# Patient Record
Sex: FEMALE | Race: WHITE | NOT HISPANIC OR LATINO | Employment: FULL TIME | URBAN - METROPOLITAN AREA
[De-identification: names, ages, dates, MRNs, and addresses within clinical notes are randomized per-mention and may not be internally consistent; named-entity substitution may affect disease eponyms.]

---

## 2017-04-14 ENCOUNTER — TRANSFERRED RECORDS (OUTPATIENT)
Dept: HEALTH INFORMATION MANAGEMENT | Facility: CLINIC | Age: 32
End: 2017-04-14

## 2017-04-14 LAB
ALT SERPL-CCNC: 27 IU/L (ref 6–31)
AST SERPL-CCNC: 17 IU/L (ref 10–40)
CHOLEST SERPL-MCNC: 157 MG/DL (ref 114–200)
CREAT SERPL-MCNC: 0.81 MG/DL (ref 0.4–1)
GFR SERPL CREATININE-BSD FRML MDRD: >60 ML/MIN/1.73M2
GLUCOSE SERPL-MCNC: 84 MG/DL (ref 70–100)
HBA1C MFR BLD: 5.3 % (ref 4–6)
HDLC SERPL-MCNC: 37 MG/DL (ref 40–60)
LDLC SERPL CALC-MCNC: 92 MG/DL
POTASSIUM SERPL-SCNC: 4.3 MEQ/L (ref 3.4–5.1)
TRIGL SERPL-MCNC: 140 MG/DL (ref 10–200)
TSH SERPL-ACNC: 2.74 UIU/ML (ref 0.4–3.99)

## 2018-06-28 NOTE — PROGRESS NOTES
SUBJECTIVE:   Brigette Byrne is a 33 year old female who presents to clinic today for the following health issues:    New Patient/Transfer of Care  Do you have a primary care doctor? No  When was your last routine physical? Over 1 year ago    Preventive health  Pap smear last done over 3 years ago. Due.   Tdap due.    Weight loss:  -- Patient is looking for ways to increase weight loss. She is using the [employer] ehsan and has been 1 week pop free.     --Went to weight loss clinic in past.  Had psychology eval and nutritionist.  Was on path to bariatric surgery, but did not qualify (no weight loss).    -- Is now in a better place - out of nursing school, son with autism is older and more manageable, is able to focus on self.     -- Exercise: will be starting 21 day program tomorrow.    PROBLEMS TO ADD ON:  1. Asthma - not well controlled per pt history.  Had insurance issues with previous controller medications.  2. Hirsutism, hx of abnormal uterine bleeding s/p ablation.  3. LE edema - requesting compression stockings    Problem list and histories reviewed & adjusted, as indicated.  Additional history: as documented    Patient Active Problem List   Diagnosis     Morbid obesity (H)     Asthma     History reviewed. No pertinent surgical history.    Social History   Substance Use Topics     Smoking status: Light Tobacco Smoker     Types: Cigarettes     Smokeless tobacco: Never Used     Alcohol use No     Family History   Problem Relation Age of Onset     Hyperlipidemia Mother      Hypertension Father      Hyperthyroidism Maternal Grandmother      Diabetes Maternal Grandfather          Current Outpatient Prescriptions   Medication Sig Dispense Refill     ADVAIR DISKUS 250-50 MCG/DOSE diskus inhaler INL 1 PUFF ITL TWICE DAILY. RM AFTER U  9     buPROPion (WELLBUTRIN XL) 150 MG 24 hr tablet TK 1 T PO QD DO NOT CRUSH  2     ranitidine (ZANTAC) 150 MG tablet TK 1 T PO BID  1     No Known Allergies    Reviewed and updated as  "needed this visit by clinical staff       Reviewed and updated as needed this visit by Provider         ROS:  All other systems on a 10-point review are negative, unless otherwise noted in HPI      OBJECTIVE:     /64 (BP Location: Right arm, Patient Position: Chair, Cuff Size: Adult Large)  Pulse 94  Temp 98.3  F (36.8  C) (Oral)  Ht 5' 2.5\" (1.588 m)  Wt 319 lb 12.8 oz (145.1 kg)  SpO2 96%  BMI 57.56 kg/m2  Body mass index is 57.56 kg/(m^2).  GENERAL: obese, alert and no distress  EYES: Eyes grossly normal to inspection  NECK: no asymmetry, masses, or scars  MS: no gross musculoskeletal defects noted, no edema  SKIN: no suspicious lesions or rashes  NEURO: mentation intact and speech normal      Diagnostic Test Results:  none     ASSESSMENT/PLAN:         1. Morbid obesity (H)  Counseled pt extensively regarding lifestyle modifications for weight loss and medical management, including medication options, benefits, risks.  Will check obesity-related illnesses.  Pt to contact insurance to determine preferred weight loss medications and/or out of pocket costs.  - Will initiate metformin for PCOS (see below)  - **A1C FUTURE anytime; Future  - **TSH with free T4 reflex FUTURE 1yr; Future  - Lipid panel reflex to direct LDL Fasting; Future  - **Comprehensive metabolic panel FUTURE anytime; Future  - order for DME; Equipment being ordered: compression stockings 15-20 mmHg  Dispense: 1 each; Refill: 0    2. PCOS (polycystic ovarian syndrome)  Diagnosis made clinically based on hirsutism and hx of dysfunctional uterine bleeding s/p   - metFORMIN (GLUCOPHAGE-XR) 500 MG 24 hr tablet; Take 1 tablet (500 mg) by mouth 2 times daily (with meals)  Dispense: 180 tablet; Refill: 1    3. Uncomplicated asthma, unspecified asthma severity, unspecified whether persistent  Not well-controlled per patient.  Uses rescue inhaler frequently.    -- MTM referral    4. Screening for human immunodeficiency virus  - **HIV Antigen " Antibody Combo FUTURE anytime; Future    Patient Instructions   INSTRUCTIONS:  1. Continue current lifestyle modifications.  Continue logging calories on lose it.  2. Start metformin 500 mg daily (increase as tolerated to twice daily)  3. Call insurance company regarding below medications  4. MTM referral for asthma management    Weight loss Medications    Medications FDA approved for chronic management of weight loss include:   - orlistat (Xenical) 120 mg orally 3 times daily   - liraglutide (Saxenda) 3 mg subcutaneous injection once daily (after 4-week escalation)   - lorcaserin (Belviq) 10 mg orally twice daily   - phentermine plus extended-release topiramate combination (Qsymia) 7.5 mg/46 mg orally once daily   - naltrexone/bupropion extended release (Contrave), initially 8 mg/90 mg orally once daily, may be increased over several weeks to 16 mg/180 mg orally twice daily   - Victoza    F/u 1 month    Greater than 50% of the 25 minute visit was spent counseling about lifestyle modifications for weight loss and medication options.     Roney Garcia MD  [facility]

## 2018-06-29 ENCOUNTER — OFFICE VISIT (OUTPATIENT)
Dept: PEDIATRICS | Facility: CLINIC | Age: 33
End: 2018-06-29
Payer: COMMERCIAL

## 2018-06-29 VITALS
BODY MASS INDEX: 51.91 KG/M2 | HEIGHT: 63 IN | WEIGHT: 293 LBS | DIASTOLIC BLOOD PRESSURE: 64 MMHG | SYSTOLIC BLOOD PRESSURE: 166 MMHG | OXYGEN SATURATION: 96 % | HEART RATE: 94 BPM | TEMPERATURE: 98.3 F

## 2018-06-29 DIAGNOSIS — E66.01 MORBID OBESITY (H): Primary | ICD-10-CM

## 2018-06-29 DIAGNOSIS — J45.909 UNCOMPLICATED ASTHMA, UNSPECIFIED ASTHMA SEVERITY, UNSPECIFIED WHETHER PERSISTENT: ICD-10-CM

## 2018-06-29 DIAGNOSIS — E28.2 PCOS (POLYCYSTIC OVARIAN SYNDROME): ICD-10-CM

## 2018-06-29 DIAGNOSIS — Z11.4 SCREENING FOR HUMAN IMMUNODEFICIENCY VIRUS: ICD-10-CM

## 2018-06-29 PROCEDURE — 99204 OFFICE O/P NEW MOD 45 MIN: CPT | Performed by: INTERNAL MEDICINE

## 2018-06-29 RX ORDER — BUPROPION HYDROCHLORIDE 150 MG/1
TABLET ORAL
Refills: 2 | COMMUNITY
Start: 2017-07-27

## 2018-06-29 RX ORDER — METFORMIN HCL 500 MG
500 TABLET, EXTENDED RELEASE 24 HR ORAL 2 TIMES DAILY WITH MEALS
Qty: 180 TABLET | Refills: 1 | Status: SHIPPED | OUTPATIENT
Start: 2018-06-29 | End: 2020-02-10

## 2018-06-29 NOTE — PATIENT INSTRUCTIONS
INSTRUCTIONS:  1. Continue current lifestyle modifications.  Continue logging calories on lose it.  2. Start metformin 500 mg daily (increase as tolerated to twice daily)  3. Call insurance company regarding below medications  4. MTM referral for asthma management    Weight loss Medications    Medications FDA approved for chronic management of weight loss include:   - orlistat (Xenical) 120 mg orally 3 times daily   - liraglutide (Saxenda) 3 mg subcutaneous injection once daily (after 4-week escalation)   - lorcaserin (Belviq) 10 mg orally twice daily   - phentermine plus extended-release topiramate combination (Qsymia) 7.5 mg/46 mg orally once daily   - naltrexone/bupropion extended release (Contrave), initially 8 mg/90 mg orally once daily, may be increased over several weeks to 16 mg/180 mg orally twice daily   - Victoza    F/u 1 month

## 2018-06-29 NOTE — MR AVS SNAPSHOT
After Visit Summary   6/29/2018    Brigette Byrne    MRN: [id number]           Patient Information     Date Of Birth          1985        Visit Information        Provider Department      6/29/2018 9:30 AM Jacek Garcia Mai, MD [facility]        Today's Diagnoses     Morbid obesity (H)    -  1    PCOS (polycystic ovarian syndrome)        Uncomplicated asthma, unspecified asthma severity, unspecified whether persistent        Screening for human immunodeficiency virus          Care Instructions    INSTRUCTIONS:  1. Continue current lifestyle modifications.  Continue logging calories on lose it.  2. Start metformin 500 mg daily (increase as tolerated to twice daily)  3. Call insurance company regarding below medications  4. MTM referral for asthma management    Weight loss Medications    Medications FDA approved for chronic management of weight loss include:   - orlistat (Xenical) 120 mg orally 3 times daily   - liraglutide (Saxenda) 3 mg subcutaneous injection once daily (after 4-week escalation)   - lorcaserin (Belviq) 10 mg orally twice daily   - phentermine plus extended-release topiramate combination (Qsymia) 7.5 mg/46 mg orally once daily   - naltrexone/bupropion extended release (Contrave), initially 8 mg/90 mg orally once daily, may be increased over several weeks to 16 mg/180 mg orally twice daily   - Victoza    F/u 1 month          Follow-ups after your visit        Additional Services     MED THERAPY MANAGE REFERRAL       Your provider has referred you to: **[facility] Scheduling (numerous locations) [phone]   [email or web address]    Reason for Referral: Tobacco Cessation, asthma management    The [facility] department will contact you to schedule an appointment.  You may also schedule the appointment by calling [phone].  For [place] patients, please  call [phone] to confirm/schedule your appointment on the next business day.    This service is designed to help you get the most from your medications.  A specially trained Pharmacist will work closely with you and your providers to solve any questions, concerns, issues or problems related to your medications.    Please bring all of your prescription and non-prescription medications (such as vitamins, over-the-counter medications, and herbals) or a detailed medication list to your appointment.    If you have a glucose meter or other home monitoring information, please also bring this to your appointment (i.e. blood glucose log, blood pressure log, pain log, etc.).                  Follow-up notes from your care team     Return in about 4 weeks (around 7/27/2018).      Your next 10 appointments already scheduled     Jul 13, 2018  9:00 AM CDT   LAB with  LAB   [facility] ([facility])    [address] [facility] [address] [phone]           Please do not eat 10-12 hours before your appointment if you are coming in fasting for labs on lipids, cholesterol, or glucose (sugar). This does not apply to pregnant women. Water, hot tea and black coffee (with nothing added) are okay. Do not drink other fluids, diet soda or chew gum.            Jul 27, 2018  8:50 AM CDT   Office Visit with Jacek Garcia MD   [facility]    [address] [place] [address]  [address]  [facility] [address] [phone]           Bring a current list of meds and any records pertaining to this visit. For Physicals, please bring immunization records and any forms needing to be filled out. Please arrive 10 minutes early to complete paperwork.              Future tests that were ordered for you today     Open Future Orders        Priority Expected Expires Ordered    **A1C FUTURE anytime Routine 6/29/2018 6/29/2019 6/29/2018    **TSH with free T4 reflex  "FUTURE 1yr Routine 5/30/2019 6/29/2019 6/29/2018    Lipid panel reflex to direct LDL Fasting Routine 6/29/2018 6/29/2019 6/29/2018    **Comprehensive metabolic panel FUTURE anytime Routine 6/29/2018 6/29/2019 6/29/2018    **HIV Antigen Antibody Combo FUTURE anytime Routine 6/29/2018 6/29/2019 6/29/2018            Who to contact     If you have questions or need follow up information about today's clinic visit or your schedule please contact [facility] directly at [phone].  Normal or non-critical lab and imaging results will be communicated to you by Senchahart, letter or phone within 4 business days after the clinic has received the results. If you do not hear from us within 7 days, please contact the clinic through [employer]ott or phone. If you have a critical or abnormal lab result, we will notify you by phone as soon as possible.  Submit refill requests through Sanguine or call your pharmacy and they will forward the refill request to us. Please allow 3 business days for your refill to be completed.          Additional Information About Your Visit        Senchahart Information     Sanguine gives you secure access to your electronic health record. If you see a primary care provider, you can also send messages to your care team and make appointments. If you have questions, please call your primary care clinic.  If you do not have a primary care provider, please call [phone] and they will assist you.        Care EveryWhere ID     This is your Care EveryWhere ID. This could be used by other organizations to access your [place] medical records  [id number]        Your Vitals Were     Pulse Temperature Height Pulse Oximetry BMI (Body Mass Index)       94 98.3  F (36.8  C) (Oral) 5' 2.5\" (1.588 m) 96% 57.56 kg/m2        Blood Pressure from Last 3 Encounters:   06/29/18 166/64    Weight from Last 3 Encounters:   06/29/18 319 lb 12.8 oz (145.1 kg)              We Performed the Following     MED THERAPY MANAGE " REFERRAL          Today's Medication Changes          These changes are accurate as of 6/29/18 10:34 AM.  If you have any questions, ask your nurse or doctor.               Start taking these medicines.        Dose/Directions    metFORMIN 500 MG 24 hr tablet   Commonly known as:  GLUCOPHAGE-XR   Used for:  PCOS (polycystic ovarian syndrome)   Started by:  Jacek Garcia Mai, MD        Dose:  500 mg   Take 1 tablet (500 mg) by mouth 2 times daily (with meals)   Quantity:  180 tablet   Refills:  1       order for DME   Used for:  Morbid obesity (H)   Started by:  Jacek Garcia Mai, MD        Equipment being ordered: compression stockings 15-20 mmHg   Quantity:  1 each   Refills:  0            Where to get your medicines      [facility] [address] [facility] [address] [facility] AT [place] [address] & [facility]  [address] [facility] [address] [facility] [address]     Phone:  [phone]     metFORMIN 500 MG 24 hr tablet         Some of these will need a paper prescription and others can be bought over the counter.  Ask your nurse if you have questions.     Bring a paper prescription for each of these medications     order for DME                Primary Care Provider Office Phone # Fax #    Jacek Garcia [phone] [address] [place] [address]        Equal Access to Services     ISRAEL DEAN AH: Anderson vanno Soomaali, waaxda luqadaha, qaybta kaalmada adeegyada, jaime martines haymark brice. So [facility] [phone].    ATENCIÓN: Si habla español, tiene a forbes disposición servicios gratuitos de asistencia lingüística. Llame al [phone].    We comply with applicable federal civil rights laws and [place] laws. We do not discriminate on the basis of race, color, national origin, age, disability, sex, sexual orientation, or gender identity.            Thank you!     Thank you for choosing [facility]  for your  care. Our goal is always to provide you with excellent care. Hearing back from our patients is one way we can continue to improve our services. Please take a few minutes to complete the written survey that you may receive in the mail after your visit with us. Thank you!             Your Updated Medication List - Protect others around you: Learn how to safely use, store and throw away your medicines at www.disposemymeds.org.          This list is accurate as of 6/29/18 10:34 AM.  Always use your most recent med list.                   Brand Name Dispense Instructions for use Diagnosis    ADVAIR DISKUS 250-50 MCG/DOSE diskus inhaler   Generic drug:  fluticasone-salmeterol      INL 1 PUFF ITL TWICE DAILY. RM AFTER U        buPROPion 150 MG 24 hr tablet    WELLBUTRIN XL     TK 1 T PO QD DO NOT CRUSH        metFORMIN 500 MG 24 hr tablet    GLUCOPHAGE-XR    180 tablet    Take 1 tablet (500 mg) by mouth 2 times daily (with meals)    PCOS (polycystic ovarian syndrome)       order for DME     1 each    Equipment being ordered: compression stockings 15-20 mmHg    Morbid obesity (H)       ranitidine 150 MG tablet    ZANTAC     TK 1 T PO BID

## 2018-07-02 ENCOUNTER — HEALTH MAINTENANCE LETTER (OUTPATIENT)
Age: 33
End: 2018-07-02

## 2018-07-13 DIAGNOSIS — Z11.4 SCREENING FOR HUMAN IMMUNODEFICIENCY VIRUS: ICD-10-CM

## 2018-07-13 DIAGNOSIS — E66.01 MORBID OBESITY (H): ICD-10-CM

## 2018-07-13 LAB
ALBUMIN SERPL-MCNC: 3.5 G/DL (ref 3.4–5)
ALP SERPL-CCNC: 64 U/L (ref 40–150)
ALT SERPL W P-5'-P-CCNC: 39 U/L (ref 0–50)
ANION GAP SERPL CALCULATED.3IONS-SCNC: 8 MMOL/L (ref 3–14)
AST SERPL W P-5'-P-CCNC: 28 U/L (ref 0–45)
BILIRUB SERPL-MCNC: 0.5 MG/DL (ref 0.2–1.3)
BUN SERPL-MCNC: 11 MG/DL (ref 7–30)
CALCIUM SERPL-MCNC: 9 MG/DL (ref 8.5–10.1)
CHLORIDE SERPL-SCNC: 107 MMOL/L (ref 94–109)
CHOLEST SERPL-MCNC: 138 MG/DL
CO2 SERPL-SCNC: 26 MMOL/L (ref 20–32)
CREAT SERPL-MCNC: 0.82 MG/DL (ref 0.52–1.04)
GFR SERPL CREATININE-BSD FRML MDRD: 79 ML/MIN/1.7M2
GLUCOSE SERPL-MCNC: 95 MG/DL (ref 70–99)
HBA1C MFR BLD: 5.5 % (ref 0–5.6)
HDLC SERPL-MCNC: 35 MG/DL
LDLC SERPL CALC-MCNC: 74 MG/DL
NONHDLC SERPL-MCNC: 103 MG/DL
POTASSIUM SERPL-SCNC: 4.1 MMOL/L (ref 3.4–5.3)
PROT SERPL-MCNC: 6.7 G/DL (ref 6.8–8.8)
SODIUM SERPL-SCNC: 141 MMOL/L (ref 133–144)
TRIGL SERPL-MCNC: 145 MG/DL
TSH SERPL DL<=0.005 MIU/L-ACNC: 2.25 MU/L (ref 0.4–4)

## 2018-07-13 PROCEDURE — 87389 HIV-1 AG W/HIV-1&-2 AB AG IA: CPT | Performed by: INTERNAL MEDICINE

## 2018-07-13 PROCEDURE — 36415 COLL VENOUS BLD VENIPUNCTURE: CPT | Performed by: INTERNAL MEDICINE

## 2018-07-13 PROCEDURE — 83036 HEMOGLOBIN GLYCOSYLATED A1C: CPT | Performed by: INTERNAL MEDICINE

## 2018-07-13 PROCEDURE — 84443 ASSAY THYROID STIM HORMONE: CPT | Performed by: INTERNAL MEDICINE

## 2018-07-13 PROCEDURE — 80061 LIPID PANEL: CPT | Performed by: INTERNAL MEDICINE

## 2018-07-13 PROCEDURE — 80053 COMPREHEN METABOLIC PANEL: CPT | Performed by: INTERNAL MEDICINE

## 2018-07-16 LAB — HIV 1+2 AB+HIV1 P24 AG SERPL QL IA: NONREACTIVE

## 2018-09-28 DIAGNOSIS — E28.2 PCOS (POLYCYSTIC OVARIAN SYNDROME): ICD-10-CM

## 2018-09-28 NOTE — TELEPHONE ENCOUNTER
"Requested Prescriptions   Pending Prescriptions Disp Refills     metFORMIN (GLUCOPHAGE-XR) 500 MG 24 hr tablet  Last Written Prescription Date:  06/29/2018  Last Fill Quantity: 180 tablet,  # refills: 1   Last office visit: 6/29/2018 with prescribing provider:  Jacek Garcia Mai, MD   Future Office Visit:     180 tablet 1     Sig: Take 1 tablet (500 mg) by mouth 2 times daily (with meals)    Biguanide Agents Failed    9/28/2018  4:32 PM       Failed - Blood pressure less than 140/90 in past 6 months    BP Readings from Last 3 Encounters:   06/29/18 166/64                Failed - Patient has had a Microalbumin in the past 15 mos.    No lab results found.         Passed - Patient has documented LDL within the past 12 mos.    Recent Labs   Lab Test  07/13/18   0855   LDL  74            Passed - Patient is age 10 or older       Passed - Patient has documented A1c within the specified period of time.    If HgbA1C is 8 or greater, it needs to be on file within the past 3 months.  If less than 8, must be on file within the past 6 months.     Recent Labs   Lab Test  07/13/18   0855   A1C  5.5            Passed - Patient's CR is NOT>1.4 OR Patient's EGFR is NOT<45 within past 12 mos.    Recent Labs   Lab Test  07/13/18   0855   GFRESTIMATED  79   GFRESTBLACK  >90       Recent Labs   Lab Test  07/13/18   0855   CR  0.82            Passed - Patient does NOT have a diagnosis of CHF.       Passed - Patient is not pregnant       Passed - Patient has not had a positive pregnancy test within the past 12 mos.        Passed - Recent (6 mo) or future (30 days) visit within the authorizing provider's specialty    Patient had office visit in the last 6 months or has a visit in the next 30 days with authorizing provider or within the authorizing provider's specialty.  See \"Patient Info\" tab in inbasket, or \"Choose Columns\" in Meds & Orders section of the refill encounter.              "

## 2018-10-01 RX ORDER — METFORMIN HCL 500 MG
500 TABLET, EXTENDED RELEASE 24 HR ORAL 2 TIMES DAILY WITH MEALS
Qty: 180 TABLET | Refills: 1 | OUTPATIENT
Start: 2018-10-01

## 2018-10-01 NOTE — TELEPHONE ENCOUNTER
Pt should still have some left, refill send 6/29/18, dispense 180 with 1 refill to the same pharmacy.  Will send a note to the pharmacy advising of this.

## 2019-02-18 ENCOUNTER — OFFICE VISIT (OUTPATIENT)
Dept: PEDIATRICS | Facility: CLINIC | Age: 34
End: 2019-02-18
Payer: COMMERCIAL

## 2019-02-18 ENCOUNTER — ANCILLARY PROCEDURE (OUTPATIENT)
Dept: GENERAL RADIOLOGY | Facility: CLINIC | Age: 34
End: 2019-02-18
Attending: PHYSICIAN ASSISTANT
Payer: COMMERCIAL

## 2019-02-18 VITALS
TEMPERATURE: 97.7 F | OXYGEN SATURATION: 97 % | BODY MASS INDEX: 51.91 KG/M2 | WEIGHT: 293 LBS | HEART RATE: 84 BPM | HEIGHT: 63 IN | SYSTOLIC BLOOD PRESSURE: 122 MMHG | DIASTOLIC BLOOD PRESSURE: 78 MMHG

## 2019-02-18 DIAGNOSIS — E66.01 MORBID OBESITY (H): ICD-10-CM

## 2019-02-18 DIAGNOSIS — R05.9 COUGH: ICD-10-CM

## 2019-02-18 DIAGNOSIS — J45.41 MODERATE PERSISTENT ASTHMA WITH EXACERBATION: Primary | ICD-10-CM

## 2019-02-18 PROCEDURE — 71046 X-RAY EXAM CHEST 2 VIEWS: CPT

## 2019-02-18 PROCEDURE — 99214 OFFICE O/P EST MOD 30 MIN: CPT | Performed by: PHYSICIAN ASSISTANT

## 2019-02-18 RX ORDER — AZITHROMYCIN 250 MG/1
TABLET, FILM COATED ORAL
Qty: 6 TABLET | Refills: 0 | Status: SHIPPED | OUTPATIENT
Start: 2019-02-18 | End: 2019-02-23

## 2019-02-18 RX ORDER — PREDNISONE 20 MG/1
TABLET ORAL
Qty: 20 TABLET | Refills: 0 | Status: SHIPPED | OUTPATIENT
Start: 2019-02-18 | End: 2019-03-02

## 2019-02-18 RX ORDER — ALBUTEROL SULFATE 0.83 MG/ML
2.5 SOLUTION RESPIRATORY (INHALATION) EVERY 6 HOURS PRN
Qty: 1 BOX | Refills: 11 | Status: SHIPPED | OUTPATIENT
Start: 2019-02-18 | End: 2020-02-10

## 2019-02-18 ASSESSMENT — MIFFLIN-ST. JEOR: SCORE: 2109.98

## 2019-02-18 NOTE — PROGRESS NOTES
"  SUBJECTIVE:   Brigette Byrne is a 33 year old female who presents to clinic today for the following health issues:    Acute Illness   Acute illness concerns: fever and SOB  Onset: about 3 days ago     Fever: yes--103.5 two days ago; resolved last night    Chills/Sweats: YES- sweats    Headache (location?): no     Sinus Pressure:YES, pnd    Conjunctivitis:  No, watery     Ear Pain: no    Rhinorrhea: YES    Congestion: YES- chest    Sore Throat: YES- when coughing      Cough: YES-non-productive    Wheeze: YES    Decreased Appetite: YES    Nausea: no     Vomiting: no    Diarrhea:  no    Dysuria/Freq.: no    Fatigue/Achiness: YES- achiness    Sick/Strep Exposure: YES     Therapies Tried and outcome: nebulizer four times daily and advair twice daily; sudafed and tylenol/ibuprofen  History of asthma  Flu shot given  Patient works in [employer] as an RN     ROS:  ROS otherwise negative    OBJECTIVE:                                                    /78 (BP Location: Right arm, Patient Position: Sitting, Cuff Size: Adult Large)   Pulse 84   Temp 97.7  F (36.5  C) (Tympanic)   Ht 1.588 m (5' 2.5\")   Wt 144.9 kg (319 lb 6.4 oz)   SpO2 97%   BMI 57.49 kg/m    Body mass index is 57.49 kg/m .   GENERAL: alert, no distress  HENT: ear canals- normal; TMs- normal; Nose- normal; Mouth- no ulcers, no lesions  NECK: no tenderness, no adenopathy  RESP: diminished breath sounds throughout with wheezing and rhonchi  CV: regular rates and rhythm, normal S1 S2, no S3 or S4 and no murmur, no click or rub    Diagnostic test results:  CXR appears NEGATIVE      ASSESSMENT/PLAN:                                                    (J45.41) Moderate persistent asthma with exacerbation  (primary encounter diagnosis)  Comment: begin oral prednisone taper and antibiotics given exposures. Continue with albuterol PRN.   Plan: XR Chest 2 Views, azithromycin (ZITHROMAX) 250         MG tablet, predniSONE (DELTASONE) 20 MG tablet,        albuterol " (PROVENTIL) (2.5 MG/3ML) 0.083% neb         solution            (E66.01) Morbid obesity (H)  Comment:   Plan:     Nicol Kauffman PA-C  [facility]

## 2019-02-18 NOTE — LETTER
My Asthma Action Plan  Name: Brigette Byrne   YOB: 1985  Date: 2/18/2019   My doctor: Nicol Kauffman PA-C   My clinic: [facility]        My Control Medicine: Fluticasone + salmeterol (Advair) -  Diskus 250/50 mcg bid  My Rescue Medicine: Albuterol (Proair/Ventolin/Proventil) inhaler qid    My Asthma Severity: moderate persistent  Avoid your asthma triggers: upper respiratory infections, humidity, strong odors and fumes, exercise or sports and cold air               GREEN ZONE   Good Control    I feel good    No cough or wheeze    Can work, sleep and play without asthma symptoms       Take your asthma control medicine every day.     1. If exercise triggers your asthma, take your rescue medication    15 minutes before exercise or sports, and    During exercise if you have asthma symptoms  2. Spacer to use with inhaler: If you have a spacer, make sure to use it with your inhaler             YELLOW ZONE Getting Worse  I have ANY of these:    I do not feel good    Cough or wheeze    Chest feels tight    Wake up at night   1. Keep taking your Green Zone medications  2. Start taking your rescue medicine:    every 20 minutes for up to 1 hour. Then every 4 hours for 24-48 hours.  3. If you stay in the Yellow Zone for more than 12-24 hours, contact your doctor.  4. If you do not return to the Green Zone in 12-24 hours or you get worse, start taking your oral steroid medicine if prescribed by your provider.           RED ZONE Medical Alert - Get Help  I have ANY of these:    I feel awful    Medicine is not helping    Breathing getting harder    Trouble walking or talking    Nose opens wide to breathe       1. Take your rescue medicine NOW  2. If your provider has prescribed an oral steroid medicine, start taking it NOW  3. Call your doctor NOW  4. If you are still in the Red Zone after 20 minutes and you have not reached your doctor:    Take your rescue medicine again and    Call 911 or  go to the emergency room right away    See your regular doctor within 2 weeks of an Emergency Room or Urgent Care visit for follow-up treatment.          Annual Reminders:  Meet with Asthma Educator,  Flu Shot in the Fall, consider Pneumonia Vaccination for patients with asthma (aged 19 and older).    Pharmacy: [id number]  [place] - [address] [address] [facility] [address] [facility]                      Asthma Triggers  How To Control Things That Make Your Asthma Worse    Triggers are things that make your asthma worse.  Look at the list below to help you find your triggers and what you can do about them.  You can help prevent asthma flare-ups by staying away from your triggers.      Trigger                                                          What you can do   Cigarette Smoke  Tobacco smoke can make asthma worse. Do not allow smoking in your home, car or around you.  Be sure no one smokes at a child s day care or school.  If you smoke, ask your health care provider for ways to help you quit.  Ask family members to quit too.  Ask your health care provider for a referral to Quit Plan to help you quit smoking, or call 5-062-590-PLAN.     Colds, Flu, Bronchitis  These are common triggers of asthma. Wash your hands often.  Don t touch your eyes, nose or mouth.  Get a flu shot every year.     Dust Mites  These are tiny bugs that live in cloth or carpet. They are too small to see. Wash sheets and blankets in hot water every week.   Encase pillows and mattress in dust mite proof covers.  Avoid having carpet if you can. If you have carpet, vacuum weekly.   Use a dust mask and HEPA vacuum.   Pollen and Outdoor Mold  Some people are allergic to trees, grass, or weed pollen, or molds. Try to keep your windows closed.  Limit time out doors when pollen count is high.   Ask you health care provider about taking medicine during allergy season.     Animal Dander  Some people are allergic to skin  flakes, urine or saliva from pets with fur or feathers. Keep pets with fur or feathers out of your home.    If you can t keep the pet outdoors, then keep the pet out of your bedroom.  Keep the bedroom door closed.  Keep pets off cloth furniture and away from stuffed toys.     Mice, Rats, and Cockroaches  Some people are allergic to the waste from these pests.   Cover food and garbage.  Clean up spills and food crumbs.  Store grease in the refrigerator.   Keep food out of the bedroom.   Indoor Mold  This can be a trigger if your home has high moisture. Fix leaking faucets, pipes, or other sources of water.   Clean moldy surfaces.  Dehumidify basement if it is damp and smelly.   Smoke, Strong Odors, and Sprays  These can reduce air quality. Stay away from strong odors and sprays, such as perfume, powder, hair spray, paints, smoke incense, paint, cleaning products, candles and new carpet.   Exercise or Sports  Some people with asthma have this trigger. Be active!  Ask your doctor about taking medicine before sports or exercise to prevent symptoms.    Warm up for 5-10 minutes before and after sports or exercise.     Other Triggers of Asthma  Cold air:  Cover your nose and mouth with a scarf.  Sometimes laughing or crying can be a trigger.  Some medicines and food can trigger asthma.

## 2019-02-18 NOTE — LETTER
February 18, 2019      Brigette Byrne  [address]  [place] [address]        To Whom It May Concern:    Brigette Byrne  was seen on 12/18/19.  Please excuse her through 2/19/19 due to illness.        Sincerely,        Nicol Kauffman PA-C

## 2019-02-19 ASSESSMENT — ASTHMA QUESTIONNAIRES: ACT_TOTALSCORE: 9

## 2020-02-10 ENCOUNTER — OFFICE VISIT (OUTPATIENT)
Dept: FAMILY MEDICINE | Facility: CLINIC | Age: 35
End: 2020-02-10
Payer: COMMERCIAL

## 2020-02-10 VITALS
HEIGHT: 63 IN | HEART RATE: 83 BPM | OXYGEN SATURATION: 95 % | DIASTOLIC BLOOD PRESSURE: 80 MMHG | BODY MASS INDEX: 51.91 KG/M2 | RESPIRATION RATE: 16 BRPM | TEMPERATURE: 98.2 F | SYSTOLIC BLOOD PRESSURE: 118 MMHG | WEIGHT: 293 LBS

## 2020-02-10 DIAGNOSIS — J45.41 MODERATE PERSISTENT ASTHMA WITH EXACERBATION: ICD-10-CM

## 2020-02-10 DIAGNOSIS — J20.9 ACUTE BRONCHITIS WITH SYMPTOMS > 10 DAYS: Primary | ICD-10-CM

## 2020-02-10 PROCEDURE — 99214 OFFICE O/P EST MOD 30 MIN: CPT | Performed by: FAMILY MEDICINE

## 2020-02-10 RX ORDER — PREDNISONE 20 MG/1
TABLET ORAL
COMMUNITY
Start: 2020-02-07

## 2020-02-10 RX ORDER — OSELTAMIVIR PHOSPHATE 75 MG/1
CAPSULE ORAL
COMMUNITY
Start: 2020-02-07

## 2020-02-10 RX ORDER — AZITHROMYCIN 250 MG/1
TABLET, FILM COATED ORAL
Qty: 6 TABLET | Refills: 0 | Status: SHIPPED | OUTPATIENT
Start: 2020-02-10 | End: 2020-02-15

## 2020-02-10 RX ORDER — PREDNISONE 20 MG/1
20 TABLET ORAL DAILY
Qty: 5 TABLET | Refills: 0 | Status: SHIPPED | OUTPATIENT
Start: 2020-02-10 | End: 2020-02-15

## 2020-02-10 RX ORDER — ALBUTEROL SULFATE 0.83 MG/ML
2.5 SOLUTION RESPIRATORY (INHALATION) EVERY 6 HOURS PRN
Qty: 1 BOX | Refills: 11 | Status: SHIPPED | OUTPATIENT
Start: 2020-02-10

## 2020-02-10 ASSESSMENT — MIFFLIN-ST. JEOR: SCORE: 2021.52

## 2020-02-10 NOTE — PATIENT INSTRUCTIONS
"ASSESSMENT AND PLAN  1. Moderate persistent asthma with exacerbation  Gave additional 5 days prednisone to use if needed.     Continue nebulizer at least four times daily.     Start azithromycin.     If not substantially better in 3-4 days follow up and would do chest xray or if worsening.     Complete tamiflu.     Follow up for physical/pap smear/ pneumonia vaccine when able.      - predniSONE (DELTASONE) 20 MG tablet; Take 1 tablet (20 mg) by mouth daily for 5 days  Dispense: 5 tablet; Refill: 0  - albuterol (PROVENTIL) (2.5 MG/3ML) 0.083% neb solution; Take 1 vial (2.5 mg) by nebulization every 6 hours as needed for shortness of breath / dyspnea or wheezing  Dispense: 1 Box; Refill: 11    2. Acute bronchitis with symptoms > 10 days    - azithromycin (ZITHROMAX) 250 MG tablet; Take 2 tablets (500 mg) by mouth daily for 1 day, THEN 1 tablet (250 mg) daily for 4 days.  Dispense: 6 tablet; Refill: 0  - predniSONE (DELTASONE) 20 MG tablet; Take 1 tablet (20 mg) by mouth daily for 5 days  Dispense: 5 tablet; Refill: 0  - albuterol (PROVENTIL) (2.5 MG/3ML) 0.083% neb solution; Take 1 vial (2.5 mg) by nebulization every 6 hours as needed for shortness of breath / dyspnea or wheezing  Dispense: 1 Box; Refill: 11        [facility] SIGN UP: [email or web address] , [phone]    E-VISITS CAN BE DONE FOR CARE/PRESCRIPTIONS WHICH MAY NOT NEED AN IN-PERSON ASSESSMENT - click \"on-line care, then request e-visit\".      ONCARE VISIT/PRESCRIPTIONS: [email or web address]  - we treat nearly 50 common conditions with one hour response time     RADIOLOGY SCHEDULING  [facility]:  [phone]   [facility]: [phone]  [facility]: [phone]    Mammogram and Colonoscopy [phone]    Smoking Cessation: www.quitplan.org, 3-112-458-PLAN (6258)    Pharmacy savings card application: www.[employer]    CONSUMER PRICE LINE for estimates of test costs:  [phone]       "

## 2020-02-10 NOTE — PROGRESS NOTES
Subjective     Brigette Byrne is a 34 year old female who presents to clinic today for the following health issues:    HPI   Acute Illness   Acute illness concerns: cough   Onset: x Thursday    Fever: no     Chills/Sweats: no     Headache (location?): no     Sinus Pressure:YES- tender and post-nasal drainage    Conjunctivitis:  no    Ear Pain: no    Rhinorrhea: YES    Congestion: YES- both    Sore Throat: no     Cough: YES-wet cough     Wheeze: YES    Decreased Appetite: no     Nausea: YES- due Tamiflu    Vomiting: no     Diarrhea:  no     Dysuria/Freq.: no     Fatigue/Achiness: YES- both     Sick/Strep Exposure: YES- work      Therapies Tried and outcome: Currently on Tamiflu and Prednisone since Friday night.  Has been seen in [facility]. Has also been using Mucinec, nebulizers, Advair and Tylenol.         Acute bronchitis with symptoms > 10 days  Moderate persistent asthma with exacerbation :started on prednisone/tamiflu on Saturday (3 days ago).  Not really better/worse wheezing.  Flu swab was negative but high risk.     Using albuterol nebulizers four times daily.  Does have dyspnea with exertion.  It does help  Usually in these cases antibiotics (azithro) are what helps her feel better. Happens once a year.  Did have flu shot.             Problem list, Medication list, Allergies, and Medical/Social/Surgical histories reviewed in [facility] and updated as appropriate.  Labs reviewed in EPIC  BP Readings from Last 3 Encounters:   02/10/20 118/80   02/18/19 122/78   06/29/18 166/64    Wt Readings from Last 3 Encounters:   02/10/20 136.5 kg (301 lb)   02/18/19 144.9 kg (319 lb 6.4 oz)   06/29/18 145.1 kg (319 lb 12.8 oz)                  Patient Active Problem List   Diagnosis     Morbid obesity (H)     Asthma     Past Surgical History:   Procedure Laterality Date     CHOLECYSTECTOMY  2016     TUBAL LIGATION  2008       Social History     Tobacco Use     Smoking status: Light Tobacco Smoker     Types:  "Cigarettes     Smokeless tobacco: Never Used   Substance Use Topics     Alcohol use: No     Family History   Problem Relation Age of Onset     Hyperlipidemia Mother      Hypertension Father          at 33 aortic aneurism     Hyperthyroidism Maternal Grandmother      Diabetes Maternal Grandfather          Current Outpatient Medications   Medication Sig Dispense Refill     ADVAIR DISKUS 250-50 MCG/DOSE diskus inhaler INL 1 PUFF ITL TWICE DAILY. RM AFTER U  9     albuterol (PROVENTIL) (2.5 MG/3ML) 0.083% neb solution Take 1 vial (2.5 mg) by nebulization every 6 hours as needed for shortness of breath / dyspnea or wheezing 1 Box 11     azithromycin (ZITHROMAX) 250 MG tablet Take 2 tablets (500 mg) by mouth daily for 1 day, THEN 1 tablet (250 mg) daily for 4 days. 6 tablet 0     order for DME Equipment being ordered: compression stockings 15-20 mmHg 1 each 0     oseltamivir (TAMIFLU) 75 MG capsule        predniSONE (DELTASONE) 20 MG tablet        predniSONE (DELTASONE) 20 MG tablet Take 1 tablet (20 mg) by mouth daily for 5 days 5 tablet 0     buPROPion (WELLBUTRIN XL) 150 MG 24 hr tablet TK 1 T PO QD DO NOT CRUSH  2     No Known Allergies  Recent Labs   Lab Test 18  0855 17   A1C 5.5 5.3   LDL 74 92   HDL 35* 37*   TRIG 145 140   ALT 39 27   CR 0.82 0.81   GFRESTIMATED 79 >60   GFRESTBLACK >90  --    POTASSIUM 4.1 4.3   TSH 2.25 2.74        ROS:  Constitutional, HEENT, cardiovascular, pulmonary, GI, , musculoskeletal, neuro, skin, endocrine and psych systems are negative, except as otherwise noted.        OBJECTIVE:  /80 (BP Location: Right arm, Cuff Size: Adult Large)   Pulse 83   Temp 98.2  F (36.8  C) (Oral)   Resp 16   Ht 1.588 m (5' 2.5\")   Wt 136.5 kg (301 lb)   SpO2 95%   BMI 54.18 kg/m      EXAM:  GENERAL APPEARANCE: healthy, alert and no distress but appears worn out  RESP: diffuse wheeze, no crackles, some  Ronchi. Moderate air movement.  No retractions.   Tympanic membranes " clear  No tonsil hypertrophy or exudate.   Shotty cervical lymphadenopathy   CV: regular rates and rhythm, normal S1 S2, no S3 or S4 and no murmur, click or rub -  ABDOMEN:  soft, nontender, no HSM or masses and bowel sounds normal      ASSESSMENT AND PLAN  Patient Instructions   ASSESSMENT AND PLAN  1. Moderate persistent asthma with exacerbation  Gave additional 5 days prednisone to use if needed.     Continue nebulizer at least four times daily.     Start azithromycin.     If not substantially better in 3-4 days follow up and would do chest xray or if worsening.     Complete tamiflu.     Follow up for physical/pap smear/ pneumonia vaccine when able. Has not had pap smear since about  2009 (had ablation).  Moved from [place] recently. Needs new primary.  Offered to schedule establish care  Visit  Today.     tonyuuleandro qualify for pneumonia vaccine with asthma she plans to get.       - predniSONE (DELTASONE) 20 MG tablet; Take 1 tablet (20 mg) by mouth daily for 5 days  Dispense: 5 tablet; Refill: 0  - albuterol (PROVENTIL) (2.5 MG/3ML) 0.083% neb solution; Take 1 vial (2.5 mg) by nebulization every 6 hours as needed for shortness of breath / dyspnea or wheezing  Dispense: 1 Box; Refill: 11    2. Acute bronchitis with symptoms > 10 days/ likely influenza with false negative swab.     - azithromycin (ZITHROMAX) 250 MG tablet; Take 2 tablets (500 mg) by mouth daily for 1 day, THEN 1 tablet (250 mg) daily for 4 days.  Dispense: 6 tablet; Refill: 0  - predniSONE (DELTASONE) 20 MG tablet; Take 1 tablet (20 mg) by mouth daily for 5 days  Dispense: 5 tablet; Refill: 0  - albuterol (PROVENTIL) (2.5 MG/3ML) 0.083% neb solution; Take 1 vial (2.5 mg) by nebulization every 6 hours as needed for shortness of breath / dyspnea or wheezing  Dispense: 1 Box; Refill: 11        [facility] SIGN UP: [email or web address] , [phone]    E-VISITS CAN BE DONE FOR CARE/PRESCRIPTIONS WHICH MAY NOT NEED AN IN-PERSON ASSESSMENT - click  "\"on-line care, then request e-visit\".      ONCARE VISIT/PRESCRIPTIONS: [email or web address]  - we treat nearly 50 common conditions with one hour response time     RADIOLOGY SCHEDULING  [facility]:  [phone]   [facility]: [phone]  [facility]: [phone]    Mammogram and Colonoscopy [phone]    Smoking Cessation: www.quitplan.org, 1-800-048-PLAN (2901)    Pharmacy savings card application: www.[employer]    CONSUMER PRICE LINE for estimates of test costs:  [phone]             Joel Wegener, MD        "

## 2020-02-11 ASSESSMENT — ASTHMA QUESTIONNAIRES: ACT_TOTALSCORE: 16

## 2020-02-24 ENCOUNTER — HEALTH MAINTENANCE LETTER (OUTPATIENT)
Age: 35
End: 2020-02-24

## 2020-12-13 ENCOUNTER — HEALTH MAINTENANCE LETTER (OUTPATIENT)
Age: 35
End: 2020-12-13

## 2021-04-17 ENCOUNTER — HEALTH MAINTENANCE LETTER (OUTPATIENT)
Age: 36
End: 2021-04-17

## 2021-08-09 ENCOUNTER — E-VISIT (OUTPATIENT)
Dept: URGENT CARE | Facility: CLINIC | Age: 36
End: 2021-08-09
Payer: COMMERCIAL

## 2021-08-09 DIAGNOSIS — J45.901 EXACERBATION OF ASTHMA, UNSPECIFIED ASTHMA SEVERITY, UNSPECIFIED WHETHER PERSISTENT: ICD-10-CM

## 2021-08-09 DIAGNOSIS — J01.90 ACUTE SINUSITIS WITH SYMPTOMS > 10 DAYS: ICD-10-CM

## 2021-08-09 PROCEDURE — 99421 OL DIG E/M SVC 5-10 MIN: CPT | Performed by: PHYSICIAN ASSISTANT

## 2021-08-09 RX ORDER — PREDNISONE 20 MG/1
20 TABLET ORAL DAILY
Qty: 5 TABLET | Refills: 0 | Status: SHIPPED | OUTPATIENT
Start: 2021-08-09 | End: 2021-08-14

## 2021-08-09 RX ORDER — DOXYCYCLINE HYCLATE 100 MG
100 TABLET ORAL 2 TIMES DAILY
Qty: 14 TABLET | Refills: 0 | Status: SHIPPED | OUTPATIENT
Start: 2021-08-09 | End: 2021-08-16

## 2021-08-09 NOTE — PATIENT INSTRUCTIONS
Dear Brigette Byrne    After reviewing your responses, I've been able to diagnose you with a sinus infection and asthma exacerbation?     Based on your responses and diagnosis, I have prescribed an antibiotic and a steroid to treat your symptoms. I have sent this to your pharmacy.?     It is also important to stay well hydrated, get lots of rest and take over-the-counter decongestants,?tylenol?or ibuprofen if you?are able to?take those medications per your primary care provider to help relieve discomfort.?     It is important that you take?all of?your prescribed medication even if your symptoms are improving after a few doses.? Taking?all of?your medicine helps prevent the symptoms from returning.?     If your symptoms worsen, you develop severe headache, vomiting, high fever (>102), or are not improving in 7 days, please contact your primary care provider for an appointment or visit any of our convenient Walk-in Care or Urgent Care Centers to be seen which can be found on our website?here.?     Thanks again for choosing?us?as your health care partner,?   ?  Brenda Chris PA-C?     Sinusitis (Antibiotic Treatment)    The sinuses are air-filled spaces within the bones of the face. They connect to the inside of the nose. Sinusitis is an inflammation of the tissue that lines the sinuses. Sinusitis can occur during a cold. It can also happen due to allergies to pollens and other particles in the air. Sinusitis can cause symptoms of sinus congestion and a feeling of fullness. A sinus infection causes fever, headache, and facial pain. There is often green or yellow fluid draining from the nose or into the back of the throat (post-nasal drip). You have been given antibiotics to treat this condition.   Home care    Take the full course of antibiotics as instructed. Don't stop taking them, even when you feel better.    Drink plenty of water, hot tea, and other liquids as directed by the healthcare provider. This may  help thin nasal mucus. It also may help your sinuses drain fluids.    Heat may help soothe painful areas of your face. Use a towel soaked in hot water. Or,  the shower and direct the warm spray onto your face. Using a vaporizer along with a menthol rub at night may also help soothe symptoms.     An expectorant with guaifenesin may help thin nasal mucus and help your sinuses drain fluids. Talk with your provider or pharmacists before taking an over-the-counter (OTC) medicine if you have any questions about it or its side effects..    You can use an OTC decongestant, unless a similar medicine was prescribed to you. Nasal sprays work the fastest. Use one that contains phenylephrine or oxymetazoline. First blow your nose gently. Then use the spray. Don't use these medicines more often than directed on the label. If you do, your symptoms may get worse. You may also take pills that contain pseudoephedrine. Don t use products that combine multiple medicines. This is because side effects may be increased. Read labels. You can also ask the pharmacist for help. (People with high blood pressure should not use decongestants. They can raise blood pressure.) Talk with your provider or pharmacist if you have any questions about the medicine..    OTC antihistamines may help if allergies contributed to your sinusitis. Talk with your provider or pharmacist if you have any questions about the medicine..    Don't use nasal rinses or irrigation during an acute sinus infection, unless your healthcare provider tells you to. Rinsing may spread the infection to other areas in your sinuses.    Use acetaminophen or ibuprofen to control pain, unless another pain medicine was prescribed to you. If you have chronic liver or kidney disease or ever had a stomach ulcer, talk with your healthcare provider before using these medicines. Never give aspirin to anyone under age 18 who is ill with a fever. It may cause severe liver  damage.    Don't smoke. This can make symptoms worse.    Follow-up care  Follow up with your healthcare provider, or as advised.   When to seek medical advice  Call your healthcare provider if any of these occur:     Facial pain or headache that gets worse    Stiff neck    Unusual drowsiness or confusion    Swelling of your forehead or eyelids    Symptoms don't go away in 10 days    Vision problems, such as blurred or double vision    Fever of 100.4 F (38 C) or higher, or as directed by your healthcare provider  Call 911  Call 911 if any of these occur:     Seizure    Trouble breathing    Feeling dizzy or faint    Fingernails, skin or lips look blue, purple , or gray  Prevention  Here are steps you can take to help prevent an infection:     Keep good hand washing habits.    Don t have close contact with people who have sore throats, colds, or other upper respiratory infections.    Don t smoke, and stay away from secondhand smoke.    Stay up to date with of your vaccines.  [employer] last reviewed this educational content on 12/1/2019 2000-2021 [employer]. All rights reserved. This information is not intended as a substitute for professional medical care. Always follow your healthcare professional's instructions.

## 2021-09-26 ENCOUNTER — HEALTH MAINTENANCE LETTER (OUTPATIENT)
Age: 36
End: 2021-09-26

## 2021-12-23 ENCOUNTER — IMMUNIZATION (OUTPATIENT)
Dept: NURSING | Facility: CLINIC | Age: 36
End: 2021-12-23
Payer: COMMERCIAL

## 2021-12-23 DIAGNOSIS — Z23 HIGH PRIORITY FOR 2019-NCOV VACCINE: Primary | ICD-10-CM

## 2021-12-23 PROCEDURE — 99207 PR NO CHARGE LOS: CPT

## 2021-12-23 PROCEDURE — 91306 COVID-19,PF,MODERNA (18+ YRS BOOSTER .25ML): CPT

## 2021-12-23 PROCEDURE — 0064A COVID-19,PF,MODERNA (18+ YRS BOOSTER .25ML): CPT

## 2022-05-08 ENCOUNTER — HEALTH MAINTENANCE LETTER (OUTPATIENT)
Age: 37
End: 2022-05-08

## 2022-11-03 ENCOUNTER — E-VISIT (OUTPATIENT)
Dept: URGENT CARE | Facility: CLINIC | Age: 37
End: 2022-11-03
Payer: COMMERCIAL

## 2022-11-03 DIAGNOSIS — B96.89 ACUTE BACTERIAL SINUSITIS: Primary | ICD-10-CM

## 2022-11-03 DIAGNOSIS — J01.90 ACUTE BACTERIAL SINUSITIS: Primary | ICD-10-CM

## 2022-11-03 PROCEDURE — 99421 OL DIG E/M SVC 5-10 MIN: CPT | Performed by: PHYSICIAN ASSISTANT

## 2022-11-03 NOTE — PATIENT INSTRUCTIONS
Sinusitis (Antibiotic Treatment)    The sinuses are air-filled spaces within the bones of the face. They connect to the inside of the nose. Sinusitis is an inflammation of the tissue that lines the sinuses. Sinusitis can occur during a cold. It can also happen due to allergies to pollens and other particles in the air. Sinusitis can cause symptoms of sinus congestion and a feeling of fullness. A sinus infection causes fever, headache, and facial pain. There is often green or yellow fluid draining from the nose or into the back of the throat (post-nasal drip). You have been given antibiotics to treat this condition.   Home care    Take the full course of antibiotics as instructed. Don't stop taking them, even when you feel better.    Drink plenty of water, hot tea, and other liquids as directed by the healthcare provider. This may help thin nasal mucus. It also may help your sinuses drain fluids.    Heat may help soothe painful areas of your face. Use a towel soaked in hot water. Or,  the shower and direct the warm spray onto your face. Using a vaporizer along with a menthol rub at night may also help soothe symptoms.     An expectorant with guaifenesin may help thin nasal mucus and help your sinuses drain fluids. Talk with your provider or pharmacists before taking an over-the-counter (OTC) medicine if you have any questions about it or its side effects..    You can use an OTC decongestant, unless a similar medicine was prescribed to you. Nasal sprays work the fastest. Use one that contains phenylephrine or oxymetazoline. First blow your nose gently. Then use the spray. Don't use these medicines more often than directed on the label. If you do, your symptoms may get worse. You may also take pills that contain pseudoephedrine. Don t use products that combine multiple medicines. This is because side effects may be increased. Read labels. You can also ask the pharmacist for help. (People with high blood  pressure should not use decongestants. They can raise blood pressure.) Talk with your provider or pharmacist if you have any questions about the medicine..    OTC antihistamines may help if allergies contributed to your sinusitis. Talk with your provider or pharmacist if you have any questions about the medicine..    Don't use nasal rinses or irrigation during an acute sinus infection, unless your healthcare provider tells you to. Rinsing may spread the infection to other areas in your sinuses.    Use acetaminophen or ibuprofen to control pain, unless another pain medicine was prescribed to you. If you have chronic liver or kidney disease or ever had a stomach ulcer, talk with your healthcare provider before using these medicines. Never give aspirin to anyone under age 18 who is ill with a fever. It may cause severe liver damage.    Don't smoke. This can make symptoms worse.    Follow-up care  Follow up with your healthcare provider, or as advised.   When to seek medical advice  Call your healthcare provider if any of these occur:     Facial pain or headache that gets worse    Stiff neck    Unusual drowsiness or confusion    Swelling of your forehead or eyelids    Symptoms don't go away in 10 days    Vision problems, such as blurred or double vision    Fever of 100.4 F (38 C) or higher, or as directed by your healthcare provider  Call 911  Call 911 if any of these occur:     Seizure    Trouble breathing    Feeling dizzy or faint    Fingernails, skin or lips look blue, purple , or gray  Prevention  Here are steps you can take to help prevent an infection:     Keep good hand washing habits.    Don t have close contact with people who have sore throats, colds, or other upper respiratory infections.    Don t smoke, and stay away from secondhand smoke.    Stay up to date with of your vaccines.  [employer] last reviewed this educational content on 12/1/2019 2000-2021 [employer]. All rights reserved. This  information is not intended as a substitute for professional medical care. Always follow your healthcare professional's instructions.        Dear Brigette Byrne    After reviewing your responses, I've been able to diagnose you with?a sinus infection caused by bacteria.?     Based on your responses and diagnosis, I have prescribed augmentin to treat your symptoms. I have sent this to your pharmacy.? I did increase the duration from 7 days to 10 days to cover for an ear infection as well.    It is also important to stay well hydrated, get lots of rest and take over-the-counter decongestants,?tylenol?or ibuprofen if you?are able to?take those medications per your primary care provider to help relieve discomfort.?     It is important that you take?all of?your prescribed medication even if your symptoms are improving after a few doses.? Taking?all of?your medicine helps prevent the symptoms from returning.?     If your symptoms worsen, you develop severe headache, vomiting, high fever (>102), or are not improving in 7 days, please contact your primary care provider for an appointment or visit any of our convenient Walk-in Care or Urgent Care Centers to be seen which can be found on our website?here.?     Thanks again for choosing?us?as your health care partner,?   ?  Jacqueline Horner PA-C?

## 2022-11-08 ENCOUNTER — E-VISIT (OUTPATIENT)
Dept: URGENT CARE | Facility: CLINIC | Age: 37
End: 2022-11-08
Payer: COMMERCIAL

## 2022-11-08 DIAGNOSIS — R05.1 ACUTE COUGH: Primary | ICD-10-CM

## 2022-11-08 PROCEDURE — 99421 OL DIG E/M SVC 5-10 MIN: CPT | Performed by: EMERGENCY MEDICINE

## 2022-11-08 RX ORDER — PREDNISONE 20 MG/1
20 TABLET ORAL 2 TIMES DAILY
Qty: 10 TABLET | Refills: 0 | Status: SHIPPED | OUTPATIENT
Start: 2022-11-08 | End: 2022-11-13

## 2022-11-08 NOTE — PATIENT INSTRUCTIONS
"  Dear Brigette Byrne    After reviewing your responses, I've been able to diagnose you with \"Bronchitis\" which is a common infection of your lungs that is nearly always caused by a virus. The virus causes swelling and irritation of the air passages of your lungs which leads to cough. The illness spreads from your nose and throat to your windpipe and airways. It is often called a \"chest cold\" and can last up to 2 weeks, but is not a serious illness. Exposure to cigarette smoke usually makes this significantly worse.      To treat bronchitis, the main thing to do is drink lots of fluids and rest. Cough medications over-the-counter such as mucinex, robitussin or \"cold and sinus\" medications can be helpful. Ibuprofen and Tylenol also help with fevers or aching feelings that you often have with this kind of illness. Do not take ibuprofen if you have kidney disease, stomach ulcers or allergy to aspirin.     Bronchitis is most often highly contagious as viruses are spread through the air or touch. Avoid contact with others who may become infected, particularly children, the elderly and those whose immune systems might be weak.     If your symptoms worsen, you develop chest pain or shortness of breath, fevers over 101, or are not improving in 5 days, please contact your primary care provider for an appointment or visit any of our convenient Walk-in Care or Urgent Care Centers to be seen which can be found on our website here.    Thanks again for choosing us as your health care partner,    Jesus Kellogg MD  Dear Brigette Byrne    Prednisone ordered as requested.    Thanks for choosing us as your health care partner,    Jesus Kellogg MD  "

## 2023-04-23 ENCOUNTER — HEALTH MAINTENANCE LETTER (OUTPATIENT)
Age: 38
End: 2023-04-23

## 2023-06-02 ENCOUNTER — HEALTH MAINTENANCE LETTER (OUTPATIENT)
Age: 38
End: 2023-06-02

## 2024-06-30 ENCOUNTER — HEALTH MAINTENANCE LETTER (OUTPATIENT)
Age: 39
End: 2024-06-30

## 2024-11-17 ENCOUNTER — HEALTH MAINTENANCE LETTER (OUTPATIENT)
Age: 39
End: 2024-11-17